# Patient Record
Sex: MALE | Race: WHITE | Employment: FULL TIME | URBAN - NONMETROPOLITAN AREA
[De-identification: names, ages, dates, MRNs, and addresses within clinical notes are randomized per-mention and may not be internally consistent; named-entity substitution may affect disease eponyms.]

---

## 2017-01-01 ENCOUNTER — HOSPITAL ENCOUNTER (EMERGENCY)
Age: 62
End: 2017-12-07
Payer: COMMERCIAL

## 2017-01-01 ENCOUNTER — HOSPITAL ENCOUNTER (EMERGENCY)
Age: 62
Discharge: HOME | End: 2017-11-12
Attending: EMERGENCY MEDICINE
Payer: COMMERCIAL

## 2017-01-01 VITALS
RESPIRATION RATE: 16 BRPM | TEMPERATURE: 98.3 F | HEART RATE: 81 BPM | SYSTOLIC BLOOD PRESSURE: 141 MMHG | DIASTOLIC BLOOD PRESSURE: 90 MMHG | WEIGHT: 220 LBS | HEIGHT: 72 IN | OXYGEN SATURATION: 98 % | BODY MASS INDEX: 29.8 KG/M2

## 2017-01-01 DIAGNOSIS — M79.604 RIGHT LEG PAIN: Primary | ICD-10-CM

## 2017-01-01 DIAGNOSIS — I46.9 CARDIAC ARREST (HCC): Primary | ICD-10-CM

## 2017-01-01 PROCEDURE — 99285 EMERGENCY DEPT VISIT HI MDM: CPT

## 2017-01-01 PROCEDURE — 99283 EMERGENCY DEPT VISIT LOW MDM: CPT

## 2017-01-01 PROCEDURE — 96375 TX/PRO/DX INJ NEW DRUG ADDON: CPT

## 2017-01-01 PROCEDURE — 6360000002 HC RX W HCPCS: Performed by: FAMILY MEDICINE

## 2017-01-01 PROCEDURE — 96374 THER/PROPH/DIAG INJ IV PUSH: CPT

## 2017-01-01 RX ORDER — ATROPINE SULFATE 0.1 MG/ML
INJECTION INTRAVENOUS DAILY PRN
Status: COMPLETED | OUTPATIENT
Start: 2017-01-01 | End: 2017-01-01

## 2017-01-01 RX ORDER — CHLORAL HYDRATE 500 MG
3000 CAPSULE ORAL DAILY
COMMUNITY

## 2017-01-01 RX ADMIN — EPINEPHRINE 1 MG: 0.1 INJECTION, SOLUTION ENDOTRACHEAL; INTRACARDIAC; INTRAVENOUS at 06:54

## 2017-01-01 RX ADMIN — EPINEPHRINE 1 MG: 0.1 INJECTION, SOLUTION ENDOTRACHEAL; INTRACARDIAC; INTRAVENOUS at 06:59

## 2017-01-01 RX ADMIN — ATROPINE SULFATE 1 MG: 0.1 INJECTION, SOLUTION ENDOTRACHEAL; INTRAMUSCULAR; INTRAVENOUS; SUBCUTANEOUS at 06:56

## 2017-01-01 ASSESSMENT — PAIN SCALES - GENERAL
PAINLEVEL_OUTOF10: 2
PAINLEVEL_OUTOF10: 0

## 2017-01-01 ASSESSMENT — PAIN DESCRIPTION - ORIENTATION: ORIENTATION: RIGHT

## 2017-01-01 ASSESSMENT — PAIN DESCRIPTION - PAIN TYPE: TYPE: ACUTE PAIN

## 2017-01-01 ASSESSMENT — PAIN DESCRIPTION - LOCATION: LOCATION: LEG

## 2017-02-08 PROBLEM — I82.4Z2 ACUTE DEEP VEIN THROMBOSIS (DVT) OF DISTAL END OF LEFT LOWER EXTREMITY (HCC): Status: ACTIVE | Noted: 2017-01-01

## 2017-02-09 PROBLEM — I26.99 ACUTE PULMONARY EMBOLISM (HCC): Status: ACTIVE | Noted: 2017-01-01

## 2017-02-09 PROBLEM — I82.429 ILIAC VEIN THROMBOSIS (HCC): Status: ACTIVE | Noted: 2017-01-01

## 2017-11-12 NOTE — ED PROVIDER NOTES
[facility]  eMERGENCY dEPARTMENT eNCOUnter          [address] [facility]       Chief Complaint   Patient presents with    Leg Pain     right lower leg       Nurses Notes reviewed and I agree except as noted in the HPI. HISTORY OF PRESENT ILLNESS    Paige Milan is a 58 y.o. male who presented Via private vehicle with chief complaint mentioned above. He woke up this morning with right leg pain. He describes his pain as mild sharp sudden pain at the lateral aspect of his right leg just above the ankle. His pain was worse with walking. His pain resolved, he said, that he's pain free at the moment. He is worried because he has history of DVT in the left leg. He denies recent trauma. REVIEW OF SYSTEMS     He denies fever or chills, nausea and vomiting, he denies chest pain or shortness of breath    PAST MEDICAL HISTORY    has a past medical history of History of blood clots. SURGICAL HISTORY      has a past surgical history that includes Colonoscopy. CURRENT MEDICATIONS       Previous Medications    OMEGA-3 FATTY ACIDS (FISH OIL) 1000 MG CAPS    Take 3,000 mg by mouth daily    TURMERIC 1053 MG TABS    Take 1 tablet by mouth       ALLERGIES     has No Known Allergies. FAMILY HISTORY     indicated that his mother is alive. He indicated that his father is . family history includes Cancer in his father; Other in his mother. SOCIAL HISTORY      reports that he has never smoked. He has never used smokeless tobacco. He reports that he does not drink alcohol or use drugs. PHYSICAL EXAM     INITIAL VITALS:  height is 6' (1.829 m) and weight is 220 lb (99.8 kg). His temporal temperature is 98.3 °F (36.8 °C). His blood pressure is 141/90 (abnormal) and his pulse is 81. His respiration is 16 and oxygen saturation is 98%. Physical Exam   Constitutional: He appears well-developed and well-nourished. No distress. Neck: No JVD present.    Cardiovascular: Normal rate and

## 2017-12-07 NOTE — ED PROVIDER NOTES
eMERGENCY dEPARTMENT eNCOUnter        CHIEF COMPLAINT    No chief complaint on file. HPI    Violeta Goyal is a 58 y.o. male who presents cardiac arrest patient was SHORT of breath gurgling respirations went down CPR was started by [facility] first 911 call was at 610. CPR was started at 6. squad got there intubated had good breath sounds and difficult getting IV established her to couple I nose without any help and then presented here in the emergency room. Given his past history some blood clots last February where he was taken off his blood thinner medicine. Patient's been asystole with fixed and dilated with presents to the emergency room    Complete review of systems otherwise negative as it pertains the HPI     REVIEW OF SYSTEMS    See HPI for further details. Review of systems otherwise negative. PAST MEDICAL HISTORY    Past Medical History:   Diagnosis Date    History of blood clots        SURGICAL HISTORY    Past Surgical History:   Procedure Laterality Date    COLONOSCOPY         CURRENT MEDICATIONS    Current Outpatient Rx   Medication Sig Dispense Refill    Omega-3 Fatty Acids (FISH OIL) 1000 MG CAPS Take 3,000 mg by mouth daily      Turmeric 1053 MG TABS Take 1 tablet by mouth         ALLERGIES    No Known Allergies    FAMILY HISTORY    Family History   Problem Relation Age of Onset    Other Mother      nuclear palsy    Cancer Father      lung-smoker       SOCIAL HISTORY    Social History     Social History    Marital status: Single     Spouse name: N/A    Number of children: 0    Years of education: N/A     Social History Main Topics    Smoking status: Never Smoker    Smokeless tobacco: Never Used    Alcohol use No    Drug use: No    Sexual activity: Not on file     Other Topics Concern    Not on file     Social History Narrative    No narrative on file       PHYSICAL EXAM    VITAL SIGNS: There were no vitals taken for this visit.    Constitutional:  Well developed, well nourished, no acute distress   HENT:  Atraumatic, external ears normal, nose normal, oropharynx moist. Neck- supple   Respiratory:  Good breath sounds bilaterally at [place] when he intubated with no spontaneous breathing  Cardiovascular:  No rhythm of all  GI:  Soft, nondistended,nontender, no organomegaly, no mass, no rebound, no guarding   Musculoskeletal:  No edema, no tenderness, no deformities. Back- no tenderness   Integument:  Well hydrated, no rash     ED COURSE & MEDICAL DECISION MAKING    Pertinent Labs & Imaging studies reviewed. (See chart for details)  Patient's cardiac risk of presentation he never really regained any type of consistent rhythm pulse. We gave him a couple rounds of epi and atropine. when I called [phone]. Could be pulmonary embolism could be acute MI cardiac arrhythmia.     FINAL IMPRESSION    Cardiac  arrest     Michael Reyes MD  12/07/17 [id number]

## 2017-12-07 NOTE — ED NOTES
Next of kin signed request for examination form. Next of kin does not wish for autopsy to be done, refusing to sign authorization for post mortem examination.       Darian Lopez RN  12/07/17 0804

## 2017-12-07 NOTE — ED NOTES
Dr Janine Mccauley returned call  and informed of his pt's death.      Jon Doshi RN  12/07/17 Kristen Hua 45., RN  12/07/17 [id number]

## 2017-12-07 NOTE — CODE DOCUMENTATION
Patient to trauma room 2 via EMS with reports of witnessed arrest at home. The report was that the first call was for a syncopal episode, which turned into a full arrest.  Patient was intubated in the field. Two IO's were placed, but not flowing. One dose of EPI was given per ETT PTA. EMS reports that they had asystole with the CPR device in place and working.

## 2017-12-07 NOTE — ED NOTES
[facility] [place] 2[facility] CTR Service here and pt released to be transported to [facility].      Phylicia Pozo RN  12/07/17 [id number]

## 2017-12-07 NOTE — ED NOTES
Dr Elise Ross called and did talk to pt's sister,next of kin,  sister is agreeabe to autopsy now.      Alexus Melendez, RN  12/07/17 1100 [facility], JOEL  12/07/17 7310
